# Patient Record
(demographics unavailable — no encounter records)

---

## 2025-01-24 NOTE — PLAN
[TextEntry] : GO TO THE ER IMMEDIATELY FOR FURTHER EVALUATION  [id number] - New patient with acute illness that poses threat to bodily function and discussion of management with health care professional

## 2025-01-24 NOTE — PHYSICAL EXAM
[TextEntry] : General: alert and appears to be in discomfort Lungs: clear to auscultation bilaterally, good air exchange, no retractions, comfortable WOB CVS: Normal rate, regular rhythm, no murmur Abdomen: soft, nondistended, nontender, and no hepatosplenomegaly or masses, normoactive bowel sounds Skin: No rashes, lesions or skin changes : Significant enlargement of left scrotum with tenderness. No discoloration noted. No inguinal hernia. Orthotopic meatus, no discharge. no lesions.

## 2025-01-24 NOTE — HISTORY OF PRESENT ILLNESS
[de-identified] : Pt reports testicular pain x today. Pt has had similar pain in October of last year. Pt reports pain to L testicle. No Fevers reported at home. Normal appetite and voiding. Denies vomiting or diarrhea. [FreeTextEntry6] : NEW PATIENT  Presents with left testicular pain and swelling starting last PM Hurts more with light touch and pressure Denies scrotal discoloration, dysuria, penile discharge, or rash Similar symptoms occurred in Oct 2024, said there was an "infection" and was given antibiotics, resulting in resolution of symptoms

## 2025-01-27 NOTE — HISTORY OF PRESENT ILLNESS
[de-identified] : Mom states pt went to [facility] on 1/24/25 due to swollen testicles. Mom states pt was transferred to [facility] for US . us is normal. No fever. As per mom swelling has improved [FreeTextEntry6] : Pt presented to office on 1/24/25 due to scrotal swelling and pain- sudden onset, left side- advised to proceed to ER- presented to [facility] when [place] [facility] [place] to r/o testicular torsion; scrotal US consistent with torsion/detorsion- no surgical intervention needed emergently but pt to f/u with urology in 2weeks regarding orchiopexy; also dx with epididymitis- non bacterial, no abx needed; continuing prn pain control and ice to area.  Currently pt much improved, no dysuria, eating/drinking well, no pain; no fevers.  previously severe testicular pain also occurred 10/2024- self resolved urology apt 2/12/25 reviewed 1/24/25 ER notes and imaging in [facility]

## 2025-01-27 NOTE — HISTORY OF PRESENT ILLNESS
[de-identified] : Mom states pt went to [facility] on 1/24/25 due to swollen testicles. Mom states pt was transferred to [facility] for US . us is normal. No fever. As per mom swelling has improved [FreeTextEntry6] : Pt presented to office on 1/24/25 due to scrotal swelling and pain- sudden onset, left side- advised to proceed to ER- presented to [facility] when [place] [facility] [place] to r/o testicular torsion; scrotal US consistent with torsion/detorsion- no surgical intervention needed emergently but pt to f/u with urology in 2weeks regarding orchiopexy; also dx with epididymitis- non bacterial, no abx needed; continuing prn pain control and ice to area.  Currently pt much improved, no dysuria, eating/drinking well, no pain; no fevers.  previously severe testicular pain also occurred 10/2024- self resolved urology apt 2/12/25 reviewed 1/24/25 ER notes and imaging in [facility]

## 2025-01-27 NOTE — REVIEW OF SYSTEMS
[Fever] : no fever [Appetite Changes] : no appetite changes [Vomiting] : no vomiting [Dysuria] : no dysuria

## 2025-01-27 NOTE — PHYSICAL EXAM
[NL] : soft, nontender, nondistended, normal bowel sounds, no hepatosplenomegaly [Normal external genitalia] : normal external genitalia [FreeTextEntry6] : right testicle descended, non tender; left testicle mildly tender, + hydrocele

## 2025-01-27 NOTE — DISCUSSION/SUMMARY
[FreeTextEntry1] : D/W parent/pt testicle torsion/detorsion with epididymitis s/p ER evaluation, advise continue supportive care, Tylenol/Motrin prn pain, keep well hydrated; monitor for worsening scrotal pain, vomiting, fevers and proceed back to ER if occurring, f/u with urology as planned on 2/12/25. Note given to refrain from gym/sports until seen in f/u by urology.  time spent: 35min yes

## 2025-01-27 NOTE — DISCUSSION/SUMMARY
[FreeTextEntry1] : D/W parent/pt testicle torsion/detorsion with epididymitis s/p ER evaluation, advise continue supportive care, Tylenol/Motrin prn pain, keep well hydrated; monitor for worsening scrotal pain, vomiting, fevers and proceed back to ER if occurring, f/u with urology as planned on 2/12/25. Note given to refrain from gym/sports until seen in f/u by urology.  time spent: 35min

## 2025-06-30 NOTE — PHYSICAL EXAM
[TextEntry] : General: alert and active in no apparent distress Lungs: clear to auscultation bilaterally, good air exchange, no retractions, comfortable WOB CVS: Normal rate, regular rhythm, no murmur Abdomen: soft, nondistended, nontender, and no hepatosplenomegaly or masses, normoactive bowel sounds, no CVAT BL : right testicle descended, nontender, no swelling. + left hydrocele, nontender, left testicle palpable, penis exam normal, no lesions

## 2025-06-30 NOTE — HISTORY OF PRESENT ILLNESS
[de-identified] : [facility] follow up 06/18/2025 due to swelling scrotum. Antibiotics Ciprofloxacin 500mg given for 7 days  [FreeTextEntry6] : In addition to above: Patient with left testicular pain and swelling x 10 days, presented on the same day to [facility] ER which he was found to have left epididymitis and large left hydrocele. CBC, CMP, UA WNL.  This is patient's 3rd recurrent left epididymitis, currently feeling "fine," almost finished with ciprofloxacin course without any adverse effects  Denies testicular pain, dysuria, urinary urgency/frequency, lesions, or penile discharge or fevers. Appetite nml Patient was referred to urology after most recent episode of left epididymitis and torsion-detorsion in Jan 2025, however did not go to appointment Reviewed 6/18/25 clinical summary, labs and imaging

## 2025-06-30 NOTE — PLAN
[TextEntry] : Improving left epididymitis Stressed importance of f/u with urology, lambert. given recurrence of symptoms on the left side Continue pain control prn Complete antibiotic course as RX'd Seek medical attention if develops fever, worsening pain, swelling or new concerns  I spent 35 minutes on pertinent chart review, face to face patient care, counseling and educating the patient/family/caregiver, and referring to external providers